# Patient Record
Sex: FEMALE | Race: WHITE | NOT HISPANIC OR LATINO
[De-identification: names, ages, dates, MRNs, and addresses within clinical notes are randomized per-mention and may not be internally consistent; named-entity substitution may affect disease eponyms.]

---
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
## 2021-04-12 ENCOUNTER — APPOINTMENT (OUTPATIENT)
Age: 21
End: 2021-04-12
Payer: COMMERCIAL

## 2021-04-12 PROCEDURE — 0001A: CPT

## 2021-05-03 ENCOUNTER — APPOINTMENT (OUTPATIENT)
Age: 21
End: 2021-05-03
Payer: COMMERCIAL

## 2021-05-03 PROCEDURE — 0002A: CPT

## 2021-08-26 ENCOUNTER — TRANSCRIPTION ENCOUNTER (OUTPATIENT)
Age: 21
End: 2021-08-26

## 2021-12-27 ENCOUNTER — TRANSCRIPTION ENCOUNTER (OUTPATIENT)
Age: 21
End: 2021-12-27

## 2022-01-02 ENCOUNTER — NON-APPOINTMENT (OUTPATIENT)
Age: 22
End: 2022-01-02

## 2022-01-03 ENCOUNTER — APPOINTMENT (OUTPATIENT)
Dept: OBGYN | Facility: CLINIC | Age: 22
End: 2022-01-03
Payer: COMMERCIAL

## 2022-01-03 VITALS
BODY MASS INDEX: 24.39 KG/M2 | SYSTOLIC BLOOD PRESSURE: 102 MMHG | DIASTOLIC BLOOD PRESSURE: 70 MMHG | WEIGHT: 121 LBS | HEIGHT: 59 IN

## 2022-01-03 DIAGNOSIS — Z30.09 ENCOUNTER FOR OTHER GENERAL COUNSELING AND ADVICE ON CONTRACEPTION: ICD-10-CM

## 2022-01-03 LAB
HCG UR QL: NEGATIVE
QUALITY CONTROL: NORMAL

## 2022-01-03 PROCEDURE — 99395 PREV VISIT EST AGE 18-39: CPT

## 2022-01-03 NOTE — HISTORY OF PRESENT ILLNESS
[FreeTextEntry1] : 22 y/o virginal pt presents for annual exam without complaint. She desires to start an ocp. She goes to college in [place] and is considering becoming sexually active . In the past she was advised to start an ocp since her periods were irregular but she was fearful of weight gain and mood changes. SHe reports that she was anorexic between age [age] and got her period finally at age 17 and then it would come sporadically. She once went several months without it prompting SREEDHAR Guerrero to do a lab w/u but usually she gets a period every other month or every third. \par She does not think she has ever had the Gardasil series\par She declines exam today-citing she has no physical problems

## 2022-01-03 NOTE — PHYSICAL EXAM
[Chaperone Declined] : Patient declined chaperone [Appropriately responsive] : appropriately responsive [Oriented x3] : oriented x3

## 2022-01-03 NOTE — DISCUSSION/SUMMARY
[FreeTextEntry1] : 22 y/o virginal patient presents desiring to start ocp LMP was 12/30/2021\par Discussed with her missed pills, starting the pill and ACHES\par Discussed other birth control options-pt considering an IUD if ocp makes her gain weight or emotional\par Starting her on microgestin and will change as needed. \par Offered to send pill to pharmacy near her school as well\par ADvised to consider GArdasil series, explained timing of three injections and what they accomplish

## 2022-03-11 ENCOUNTER — NON-APPOINTMENT (OUTPATIENT)
Age: 22
End: 2022-03-11

## 2022-07-14 ENCOUNTER — APPOINTMENT (OUTPATIENT)
Dept: OBGYN | Facility: CLINIC | Age: 22
End: 2022-07-14

## 2022-07-14 VITALS
BODY MASS INDEX: 25.8 KG/M2 | WEIGHT: 128 LBS | HEIGHT: 59 IN | SYSTOLIC BLOOD PRESSURE: 100 MMHG | DIASTOLIC BLOOD PRESSURE: 58 MMHG

## 2022-07-14 DIAGNOSIS — N91.5 OLIGOMENORRHEA, UNSPECIFIED: ICD-10-CM

## 2022-07-14 LAB
HCG UR QL: NEGATIVE
QUALITY CONTROL: YES

## 2022-07-14 PROCEDURE — 99213 OFFICE O/P EST LOW 20 MIN: CPT

## 2022-07-14 NOTE — DISCUSSION/SUMMARY
[FreeTextEntry1] : Pt desires to have an understanding of why her menses are irregular-discussed dx of PCOS possibly\par Labwork and sono ordered\par Will go over all results post sono\par Pt aware that if no FAITH than she should take Provera q 3 months if no period

## 2022-07-14 NOTE — HISTORY OF PRESENT ILLNESS
[FreeTextEntry1] : 23 yo virginal patient seen for her annual exam this Jan and placed on COCs d/t possible start of sexual activities but also to get period regularly.She found she did not like the side effects and the periods were heavy and painful so she self DC'd after 4 months in April. In the past she was anorexic and had no period from approx age [age]. In 2020 she went a few months with no period but usually she gets a period every 2-3 months. She is happy being off the pill. The period she got in May was a couple of days long and not crampy

## 2022-07-20 LAB
FSH SERPL-MCNC: 2.4 IU/L
PROLACTIN SERPL-MCNC: 10.4 NG/ML
TESTOST FREE SERPL-MCNC: 1.8 PG/ML
TESTOST SERPL-MCNC: 28.2 NG/DL
TSH SERPL-ACNC: 0.93 UIU/ML

## 2022-07-25 ENCOUNTER — APPOINTMENT (OUTPATIENT)
Dept: ANTEPARTUM | Facility: CLINIC | Age: 22
End: 2022-07-25

## 2022-07-25 ENCOUNTER — APPOINTMENT (OUTPATIENT)
Dept: OBGYN | Facility: CLINIC | Age: 22
End: 2022-07-25

## 2022-07-25 ENCOUNTER — ASOB RESULT (OUTPATIENT)
Age: 22
End: 2022-07-25

## 2022-07-25 VITALS
HEIGHT: 59 IN | BODY MASS INDEX: 25.8 KG/M2 | DIASTOLIC BLOOD PRESSURE: 62 MMHG | WEIGHT: 128 LBS | SYSTOLIC BLOOD PRESSURE: 116 MMHG

## 2022-07-25 DIAGNOSIS — Z71.2 PERSON CONSULTING FOR EXPLANATION OF EXAMINATION OR TEST FINDINGS: ICD-10-CM

## 2022-07-25 PROCEDURE — 76856 US EXAM PELVIC COMPLETE: CPT

## 2022-07-25 PROCEDURE — 99213 OFFICE O/P EST LOW 20 MIN: CPT

## 2022-07-25 NOTE — DISCUSSION/SUMMARY
[FreeTextEntry1] : 21 y/o with pcos dx based on sono and oligomenorrhea\par Since pt does not want ocp we will do Provera-no periods for 3 months she should take 10 days of Provera

## 2022-07-25 NOTE — HISTORY OF PRESENT ILLNESS
[FreeTextEntry1] : 21 y/o with hx of irregular periods is currently off COCs, virginal pt was on them for cycle control. \par LMP was 7/21/2022\par Here today to go over sono results\par Sono with polyfollicular ovaries. EE=3mm\par Everything else wnl

## 2023-12-21 ENCOUNTER — APPOINTMENT (OUTPATIENT)
Dept: OBGYN | Facility: CLINIC | Age: 23
End: 2023-12-21
Payer: COMMERCIAL

## 2023-12-21 ENCOUNTER — LABORATORY RESULT (OUTPATIENT)
Age: 23
End: 2023-12-21

## 2023-12-21 VITALS
HEIGHT: 59 IN | WEIGHT: 118 LBS | SYSTOLIC BLOOD PRESSURE: 105 MMHG | DIASTOLIC BLOOD PRESSURE: 72 MMHG | BODY MASS INDEX: 23.79 KG/M2

## 2023-12-21 DIAGNOSIS — R35.0 FREQUENCY OF MICTURITION: ICD-10-CM

## 2023-12-21 DIAGNOSIS — Z01.419 ENCOUNTER FOR GYNECOLOGICAL EXAMINATION (GENERAL) (ROUTINE) W/OUT ABNORMAL FINDINGS: ICD-10-CM

## 2023-12-21 DIAGNOSIS — Z78.9 OTHER SPECIFIED HEALTH STATUS: ICD-10-CM

## 2023-12-21 DIAGNOSIS — Z87.891 PERSONAL HISTORY OF NICOTINE DEPENDENCE: ICD-10-CM

## 2023-12-21 PROCEDURE — 99212 OFFICE O/P EST SF 10 MIN: CPT | Mod: 25

## 2023-12-21 PROCEDURE — 81025 URINE PREGNANCY TEST: CPT

## 2023-12-21 PROCEDURE — 99385 PREV VISIT NEW AGE 18-39: CPT

## 2023-12-21 RX ORDER — NORETHINDRONE ACETATE AND ETHINYL ESTRADIOL AND FERROUS FUMARATE 1MG-20(21)
1-20 KIT ORAL
Qty: 3 | Refills: 3 | Status: COMPLETED | COMMUNITY
Start: 2022-01-03 | End: 2023-12-21

## 2023-12-21 RX ORDER — MEDROXYPROGESTERONE ACETATE 10 MG/1
10 TABLET ORAL DAILY
Qty: 30 | Refills: 0 | Status: COMPLETED | COMMUNITY
Start: 2022-07-25 | End: 2023-12-21

## 2023-12-21 RX ORDER — UBIDECARENONE/VIT E ACET 100MG-5
CAPSULE ORAL
Refills: 0 | Status: DISCONTINUED | COMMUNITY
End: 2023-12-21

## 2023-12-21 NOTE — DISCUSSION/SUMMARY
[FreeTextEntry1] : 23y G0 LMP 11/13 not on BCM with neg UCG today is here for annual exam.  Irreg menses:  - telephonic visit in March for menses follow up  - If >3 months late will discuss intervention   Urinary frequency:  - Urine studies sent today   RHM:  - DVS neg x 3 - Dentist, PCP, Derm as discussed  - Offered HPV vacc today. Pt unsure if she had this done therefore given info today  - Offered STI screen today. Pt accepts testing off pap  - Encouraged healthy diet, exercise, weight maint.   Eri Bolanos MD  Obstetrician/Gynecologist

## 2023-12-21 NOTE — HISTORY OF PRESENT ILLNESS
[FreeTextEntry1] : 23y G0 LMP 11/13 not on BCM with neg UCG today is here for annual exam. Pt had irregular menses and had some labs sent and a TVUS which demonstrated poly follicular ovaries. She as on COCs for 4 months last year but stopped 2/2 prolonged bleeding.  She says she has been getting her period more often since her anorexia has improved and she has gotten it monthly since Aug (except this month is late).    She reports having one episode for digital penetration from her partner but no penile intercourse.   Pt c/o urinary frequency for several weeks- months. ? if its anxiety related. She has never brought this up to any other MD.  [PapSmeardate] : NEVER

## 2023-12-23 ENCOUNTER — TRANSCRIPTION ENCOUNTER (OUTPATIENT)
Age: 23
End: 2023-12-23

## 2024-03-14 ENCOUNTER — APPOINTMENT (OUTPATIENT)
Dept: OBGYN | Facility: CLINIC | Age: 24
End: 2024-03-14

## 2024-03-14 LAB
APPEARANCE: CLEAR
BILIRUBIN URINE: NEGATIVE
BLOOD URINE: NEGATIVE
C TRACH RRNA SPEC QL NAA+PROBE: NOT DETECTED
COLOR: YELLOW
CYTOLOGY CVX/VAG DOC THIN PREP: ABNORMAL
GLUCOSE QUALITATIVE U: NEGATIVE MG/DL
HCG UR QL: NEGATIVE
KETONES URINE: NEGATIVE MG/DL
LEUKOCYTE ESTERASE URINE: ABNORMAL
N GONORRHOEA RRNA SPEC QL NAA+PROBE: NOT DETECTED
NITRITE URINE: NEGATIVE
PH URINE: 5.5
PROTEIN URINE: NEGATIVE MG/DL
QUALITY CONTROL: YES
SOURCE AMPLIFICATION: NORMAL
SOURCE TP AMPLIFICATION: NORMAL
SPECIFIC GRAVITY URINE: 1.02
T VAGINALIS RRNA SPEC QL NAA+PROBE: NOT DETECTED
URINE CYTOLOGY: NORMAL
UROBILINOGEN URINE: 1 MG/DL

## 2024-03-21 ENCOUNTER — NON-APPOINTMENT (OUTPATIENT)
Age: 24
End: 2024-03-21

## 2024-03-21 LAB — BACTERIA UR CULT: NORMAL

## 2024-12-25 PROBLEM — F10.90 ALCOHOL USE: Status: ACTIVE | Noted: 2023-12-21

## 2025-08-28 ENCOUNTER — APPOINTMENT (OUTPATIENT)
Dept: ULTRASOUND IMAGING | Facility: CLINIC | Age: 25
End: 2025-08-28
Payer: COMMERCIAL

## 2025-08-28 PROCEDURE — 76700 US EXAM ABDOM COMPLETE: CPT
